# Patient Record
Sex: MALE | Race: WHITE | NOT HISPANIC OR LATINO | Employment: OTHER | URBAN - METROPOLITAN AREA
[De-identification: names, ages, dates, MRNs, and addresses within clinical notes are randomized per-mention and may not be internally consistent; named-entity substitution may affect disease eponyms.]

---

## 2017-07-13 ENCOUNTER — HOSPITAL ENCOUNTER (EMERGENCY)
Facility: HOSPITAL | Age: 67
Discharge: HOME | End: 2017-07-14
Attending: EMERGENCY MEDICINE

## 2017-07-13 DIAGNOSIS — S01.81XA LACERATION OF FACE: ICD-10-CM

## 2017-07-13 DIAGNOSIS — S09.90XA CLOSED HEAD INJURY, INITIAL ENCOUNTER: Primary | ICD-10-CM

## 2017-07-13 DIAGNOSIS — S01.81XA LACERATION OF FACE, INITIAL ENCOUNTER: ICD-10-CM

## 2017-07-13 LAB
ALBUMIN SERPL BCP-MCNC: 3.2 G/DL
ALP SERPL-CCNC: 98 U/L
ALT SERPL W/O P-5'-P-CCNC: 38 U/L
ANION GAP SERPL CALC-SCNC: 10 MMOL/L
AST SERPL-CCNC: 41 U/L
BASOPHILS # BLD AUTO: 0.13 K/UL
BASOPHILS NFR BLD: 2.3 %
BILIRUB SERPL-MCNC: 0.3 MG/DL
BUN SERPL-MCNC: 9 MG/DL
CALCIUM SERPL-MCNC: 9.8 MG/DL
CHLORIDE SERPL-SCNC: 104 MMOL/L
CO2 SERPL-SCNC: 23 MMOL/L
CREAT SERPL-MCNC: 0.7 MG/DL
DIFFERENTIAL METHOD: ABNORMAL
EOSINOPHIL # BLD AUTO: 0.2 K/UL
EOSINOPHIL NFR BLD: 2.8 %
ERYTHROCYTE [DISTWIDTH] IN BLOOD BY AUTOMATED COUNT: 18.8 %
EST. GFR  (AFRICAN AMERICAN): >60 ML/MIN/1.73 M^2
EST. GFR  (NON AFRICAN AMERICAN): >60 ML/MIN/1.73 M^2
GLUCOSE SERPL-MCNC: 92 MG/DL
HCT VFR BLD AUTO: 30.7 %
HGB BLD-MCNC: 9.5 G/DL
LYMPHOCYTES # BLD AUTO: 1.9 K/UL
LYMPHOCYTES NFR BLD: 33 %
MCH RBC QN AUTO: 26.2 PG
MCHC RBC AUTO-ENTMCNC: 30.9 %
MCV RBC AUTO: 85 FL
MONOCYTES # BLD AUTO: 0.5 K/UL
MONOCYTES NFR BLD: 8.8 %
NEUTROPHILS # BLD AUTO: 3 K/UL
NEUTROPHILS NFR BLD: 53.1 %
PLATELET # BLD AUTO: 469 K/UL
PMV BLD AUTO: 9.3 FL
POTASSIUM SERPL-SCNC: 4 MMOL/L
PROT SERPL-MCNC: 7.6 G/DL
RBC # BLD AUTO: 3.63 M/UL
SODIUM SERPL-SCNC: 137 MMOL/L
WBC # BLD AUTO: 5.69 K/UL

## 2017-07-13 PROCEDURE — 85025 COMPLETE CBC W/AUTO DIFF WBC: CPT

## 2017-07-13 PROCEDURE — 90471 IMMUNIZATION ADMIN: CPT | Performed by: EMERGENCY MEDICINE

## 2017-07-13 PROCEDURE — 36000 PLACE NEEDLE IN VEIN: CPT

## 2017-07-13 PROCEDURE — 80053 COMPREHEN METABOLIC PANEL: CPT

## 2017-07-13 PROCEDURE — 25000003 PHARM REV CODE 250: Performed by: EMERGENCY MEDICINE

## 2017-07-13 PROCEDURE — 90715 TDAP VACCINE 7 YRS/> IM: CPT | Performed by: EMERGENCY MEDICINE

## 2017-07-13 PROCEDURE — 63600175 PHARM REV CODE 636 W HCPCS: Performed by: EMERGENCY MEDICINE

## 2017-07-13 PROCEDURE — 93005 ELECTROCARDIOGRAM TRACING: CPT

## 2017-07-13 PROCEDURE — 99285 EMERGENCY DEPT VISIT HI MDM: CPT | Mod: 25

## 2017-07-13 RX ORDER — LIDOCAINE HYDROCHLORIDE 10 MG/ML
10 INJECTION INFILTRATION; PERINEURAL
Status: COMPLETED | OUTPATIENT
Start: 2017-07-13 | End: 2017-07-13

## 2017-07-13 RX ADMIN — LIDOCAINE HYDROCHLORIDE 10 ML: 10 INJECTION, SOLUTION INFILTRATION; PERINEURAL at 09:07

## 2017-07-13 RX ADMIN — CLOSTRIDIUM TETANI TOXOID ANTIGEN (FORMALDEHYDE INACTIVATED), CORYNEBACTERIUM DIPHTHERIAE TOXOID ANTIGEN (FORMALDEHYDE INACTIVATED), BORDETELLA PERTUSSIS TOXOID ANTIGEN (GLUTARALDEHYDE INACTIVATED), BORDETELLA PERTUSSIS FILAMENTOUS HEMAGGLUTININ ANTIGEN (FORMALDEHYDE INACTIVATED), BORDETELLA PERTUSSIS PERTACTIN ANTIGEN, AND BORDETELLA PERTUSSIS FIMBRIAE 2/3 ANTIGEN 0.5 ML: 5; 2; 2.5; 5; 3; 5 INJECTION, SUSPENSION INTRAMUSCULAR at 06:07

## 2017-07-13 RX ADMIN — BACITRACIN, NEOMYCIN, POLYMYXIN B 1 EACH: 400; 3.5; 5 OINTMENT TOPICAL at 09:07

## 2017-07-13 NOTE — ED TRIAGE NOTES
Pt Arrived via EMS. Pt tripped and fell outside of gas station. Has laceration to the left eye. Admit to ETOH use. States he had 3 beers today. Denies headache, N/V/D

## 2017-07-13 NOTE — ED PROVIDER NOTES
Encounter Date: 7/13/2017    SCRIBE #1 NOTE: I, Hiram Lara, am scribing for, and in the presence of,  Lázaro Contreras MD. I have scribed the following portions of the note - Other sections scribed: HPI, ROS.       History     Chief Complaint   Patient presents with    Facial Laceration     Slipped and fell, laceration to the left eye.  Pt admits to ETOH earlier today and has left sided weakness from a previous stroke.     CC: Laceration     HPI: This 67 y.o. Male smoker with a medical history of Hypertension and Stroke presents to the ED via EMS accompanied by friend c/o laceration above left eyebrow [facility]. Last tetanus unknown. Denies any neck pain, back pain, abdominal pain, dysuria, or any other associated symptoms. Pt has no modifying factors. Pt has no prior treatment.       The history is provided by the patient. No  was used.     Review of patient's allergies indicates:  No Known Allergies  Past Medical History:   Diagnosis Date    Hypertension     Stroke      Past Surgical History:   Procedure Laterality Date    APPENDECTOMY       History reviewed. No pertinent family history.  Social History   Substance Use Topics    Smoking status: Current Every Day Smoker     Packs/day: 1.00    Smokeless tobacco: Never Used    Alcohol use Yes     Review of Systems   Constitutional: Negative for chills and fever.   HENT: Negative for congestion, ear pain, rhinorrhea and sore throat.    Eyes: Negative for pain and visual disturbance.   Respiratory: Negative for cough and shortness of breath.    Cardiovascular: Negative for chest pain.   Gastrointestinal: Negative for abdominal pain, diarrhea, nausea and vomiting.   Genitourinary: Negative for dysuria.   Musculoskeletal: Negative for back pain and neck pain.        (-) arm or leg problems   Skin: Negative for rash.        (+) Laceration above left eyebrow    Neurological: Negative for headaches.        Physical Exam     Initial Vitals [07/13/17 1705]   BP Pulse Resp Temp SpO2   (!) 95/57 96 18 98.5 °F (36.9 °C) --      MAP       69.67         Physical Exam  The patient was examined specifically for the following:   General:No significant distress, Good color, Warm and dry. Head and neck:Scalp atraumatic, Neck supple. Neurological:Appropriate conversation, Gross motor deficits. Eyes:Conjugate gaze, Clear corneas. ENT: No epistaxis. Cardiac: Regular rate and rhythm, Grossly normal heart tones. Pulmonary: Wheezing, Rales. Gastrointestinal: Abdominal tenderness, Abdominal distention. Musculoskeletal: Extremity deformity, Apparent pain with range of motion of the joints. Skin: Rash.   The findings on examination were normal except for the following: The patient has a 3.5 cm laceration of the left brow at the orbital rim.  The patient is awake alert conversational.  The neck is nontender and supple.  The patient believes he is actually well at [facility].  He identifies the month is July.  He is appropriate in conversation.  He had difficulty remembering his address.  Cranial nerves, motor and sensory examination are normal.  The neck is nontender and supple.  The lungs are clear the heart tones are normal the abdomen is nontender.  The patient's blood pressure is borderline low at 97/57.  The abdomen is nontender the extremities are nontender.  There is no pale range of motion of any joints.  ED Course   Procedures  Labs Reviewed   COMPREHENSIVE METABOLIC PANEL - Abnormal; Notable for the following:        Result Value    Albumin 3.2 (*)     AST 41 (*)     All other components within normal limits   CBC W/ AUTO DIFFERENTIAL - Abnormal; Notable for the following:     RBC 3.63 (*)     Hemoglobin 9.5 (*)     Hematocrit 30.7 (*)     MCH 26.2 (*)     MCHC 30.9 (*)     RDW 18.8 (*)     Platelets 469 (*)     Basophil% 2.3 (*)     All other components within normal limits     EKG Readings: (Independently Interpreted)    This patient has atrial fibrillation with rapid ventricular response.  The patient's EKG fails to reveal significant ST segment or T-wave changes.  There is poor R-wave progression across precordium.       X-Rays:   Independently Interpreted Readings:   Other Readings:  CT of the head reveals a right posterior cerebral artery infarct.    Medical decision making: Given the above this patient tripped and fell.  He sustained a laceration to his left brow.  There was no intracranial bleeding.  A large right posterior circulation stroke was noted.  I reviewed the medical records from [facility].  This is stable and chronic.  The patient is found to be in atrial fibrillation this is a chronic problem.  The patient is maintained on Eliquis.  The patient was noted to be confused in the emergency room from time to time though he seems more sharp at 10 PM, time of his discharge.  His friend noticed that the patient had some episodic confusion today on his visit from [facility] with the patient is admitted for rehabilitation.  He left today to take care of his rent and paying his bills.  He fell and sustained a laceration to the left brow.  I think it is likely that this patient is at his baseline.  I can find no other significant abnormalities.  I will discharge this patient outpatient evaluation and treatment with friend and the family so that he may be returned to the nursing home.  The patient's heart rate ranges between 90 and 104.  I believe this patient is at his baseline.             Scribe Attestation:   Scribe #1: I performed the above scribed service and the documentation accurately describes the services I performed. I attest to the accuracy of the note.    Attending Attestation:           Physician Attestation for Scribe:  Physician Attestation Statement for Scribe #1: I, Lázaro Contreras MD, reviewed documentation, as scribed by Hiram Lara in my presence, and it is both accurate and complete.                  ED Course     Clinical Impression:   The primary encounter diagnosis was Closed head injury, initial encounter. Diagnoses of Laceration of face and Laceration of face, initial encounter were also pertinent to this visit.                           Lázaro Contreras MD  07/13/17 8122

## 2017-07-14 VITALS
DIASTOLIC BLOOD PRESSURE: 62 MMHG | OXYGEN SATURATION: 96 % | TEMPERATURE: 98 F | HEIGHT: 65 IN | BODY MASS INDEX: 21.66 KG/M2 | HEART RATE: 100 BPM | RESPIRATION RATE: 18 BRPM | SYSTOLIC BLOOD PRESSURE: 101 MMHG | WEIGHT: 130 LBS

## 2017-07-14 NOTE — ED NOTES
Report received from [facility]. Pt resting comfortably in stretcher, attempting to get in contact with neighbor. In no acute distress. Will continue to monitor.

## 2017-07-14 NOTE — ED NOTES
Tried calling [facility] to find out pts status with them. Nurse states she will have to find out and call me back

## 2017-07-14 NOTE — ED NOTES
Nurse from [facility] states that pt was signed out by a family member this morning and is suppose to return within 24 hours.  Nurse states that they use [facility] and pt can be transported back to facility.

## 2017-07-14 NOTE — DISCHARGE INSTRUCTIONS
Please keep your wound clean and dry.  Apply Neosporin twice a day.  Return immediately if you get worse or if new problems develop.  Usual medicines.  Please follow-up with your primary care doctor, or the primary care doctor above this week.  Tylenol for pain.  Please continue usual medicines.  Rest.